# Patient Record
Sex: MALE | Race: WHITE
[De-identification: names, ages, dates, MRNs, and addresses within clinical notes are randomized per-mention and may not be internally consistent; named-entity substitution may affect disease eponyms.]

---

## 2017-03-04 ENCOUNTER — HOSPITAL ENCOUNTER (EMERGENCY)
Dept: HOSPITAL 62 - ER | Age: 37
Discharge: HOME | End: 2017-03-04
Payer: SELF-PAY

## 2017-03-04 VITALS — DIASTOLIC BLOOD PRESSURE: 78 MMHG | SYSTOLIC BLOOD PRESSURE: 141 MMHG

## 2017-03-04 DIAGNOSIS — M79.672: ICD-10-CM

## 2017-03-04 DIAGNOSIS — F17.210: ICD-10-CM

## 2017-03-04 DIAGNOSIS — S97.122A: Primary | ICD-10-CM
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
## 2017-03-04 PROCEDURE — 99283 EMERGENCY DEPT VISIT LOW MDM: CPT

## 2017-03-04 NOTE — ER DOCUMENT REPORT
ED General





- General


Chief Complaint: Toe Injury


Stated Complaint: TOE PAIN


Mode of Arrival: Ambulatory


Information source: Patient


Notes: 


28-year-old male presents after kicking his bed by accident with his left foot 

2 days ago.  Patient notes bruising pain of the fifth digit.  Denies any other 

injuries


TRAVEL OUTSIDE OF THE [place] IN LAST 30 DAYS: No





- HPI


Onset: Other


Onset/Duration: Persistent


Quality of pain: Achy


Severity: Mild


Pain Level: 1


Associated symptoms: Other


Exacerbated by: Walking


Relieved by: Denies


Similar symptoms previously: No


Recently seen / treated by doctor: No





- Related Data


Allergies/Adverse Reactions: 


 





tramadol HCl [From Ultram] Allergy (Verified 03/04/17 08:24)


 











Past Medical History





- Social History


Smoking Status: Current Every Day Smoker


Cigarette use (# per day): Yes


Chew tobacco use (# tins/day): No


Smoking Education Provided: No


Frequency of alcohol use: None


Drug Abuse: Bath salts


Family History: None - adopted


Patient has suicidal ideation: No


Patient has homicidal ideation: No


Pulmonary Medical History: Reports: Hx Asthma, Hx Bronchitis


Renal/ Medical History: Denies: Hx Peritoneal Dialysis


Past Surgical History: Reports: Hx Genitourinary Surgery - circumscised, Hx 

Herniorrhaphy, Hx Orthopedic Surgery - rt knee





- Immunizations


Immunizations up to date: Yes


Hx Diphtheria, Pertussis, Tetanus Vaccination: Yes





Review of Systems





- Review of Systems


Notes: 


REVIEW OF SYSTEMS:


CONSTITUTIONAL :  Denies fever,  chills, or sweats.  Denies recent illness.


EENT:   Denies eye, ear, throat, or mouth pain or symptoms.  Denies nasal or 

sinus congestion or discharge.  Denies throat, tongue, or mouth swelling or 

difficulty swallowing.


CARDIOVASCULAR:  Denies chest pain.  Denies palpitations or racing or irregular 

heart beat.  Denies ankle edema.


RESPIRATORY:  Denies cough, cold, or chest congestion.  Denies shortness of 

breath, difficulty breathing, or wheezing.


GASTROINTESTINAL:  Denies abdominal pain or distention.  Denies nausea, vomiting

, or diarrhea.  Denies blood in vomitus, stools, or per rectum.  Denies black, 

tarry stools.  Denies constipation.  


GENITOURINARY:  Denies difficulty urinating, painful urination, burning, 

frequency, blood in urine, or discharge.


MUSCULOSKELETAL: Admits to toe pain


SKIN:   Denies rash, lesions or sores.


HEMATOLOGIC :   Denies easy bruising or bleeding.


LYMPHATIC:  Denies swollen, enlarged glands.


NEUROLOGICAL:  Denies confusion or altered mental status.  Denies passing out 

or loss of consciousness.  Denies dizziness or lightheadedness.  Denies 

headache.  Denies weakness or paralysis or loss of use of either side.  Denies 

problems with gait or speech.  Denies sensory loss, numbness, or tingling.  

Denies seizures.


PSYCHIATRIC:  Denies anxiety or stress.  Denies depression, suicidal ideation, 

or homicidal ideation.





ALL OTHER SYSTEMS REVIEWED AND NEGATIVE.





Dictation was performed using Dragon voice recognition software 








PHYSICAL EXAMINATION:





GENERAL: Well-appearing, well-nourished and in no acute distress.





HEAD: Atraumatic, normocephalic.





EYES: Pupils equal round extraocular movements intact,  conjunctiva are normal.





ENT: Nares patent





NECK: Normal range of motion





LUNGS: No respiratory distress





Musculoskeletal: Normal range of motion





NEUROLOGICAL:  Normal speech, normal gait. 





PSYCH: Normal mood, normal affect.





SKIN: Mild ecchymosis noted of the fifth digit with edema of the left foot





Physical Exam





- Vital signs


Vitals: 





 











Temp Pulse Resp BP Pulse Ox


 


 97.8 F   79   16   148/86 H  99 


 


 03/04/17 08:08  03/04/17 08:08  03/04/17 08:08  03/04/17 08:08  03/04/17 08:08














Course





- Re-evaluation


Re-evalutation: 





03/04/17 09:06


X-ray noted no acute abnormality, patient will be kim splint is otherwise 

stable for discharge





Patient encouraged to follow-up with primary care physician for reevaluation or 

to return immediately if there are any other concerns





After performing a Medical Screening Examination, I estimate there is LOW risk 

for INTRACRANIAL HEMORRHAGE, UNSTABLE SPINE FRACTURE, CENTRAL CORD SYNDROME, 

CAUDA EQUINA, THORACIC AORTIC DISSECTION, PNEUMOTHORAX, PERFORATED BOWEL, 

RUPTURED ABDOMINAL AORTIC ANEURYSM, ACUTE TENDON RUPTURE, COMPARTMENT SYNDROME, 

or OPEN FRACTURE, thus I consider the discharge disposition reasonable. Also, 

there is no evidence or peritonitis, sepsis, or toxicity. The patient and I 

have discussed the diagnosis and risks, and we agree with discharging home to 

follow-up with their primary doctor with the understanding that symptoms and 

presentations can change. We also discussed returning to the Emergency 

Department immediately if new or worsening symptoms occur. We have discussed 

the symptoms which are most concerning (e.g., bloody stool, fever, changing or 

worsening pain, vomiting) that necessitate immediate return.





- Vital Signs


Vital signs: 





 











Temp Pulse Resp BP Pulse Ox


 


 97.8 F   79   16   148/86 H  99 


 


 03/04/17 08:08  03/04/17 08:08  03/04/17 08:08  03/04/17 08:08  03/04/17 08:08














Procedures





- Immobilization


  ** Left Toe 5th digit


Time completed: 09:07


Pre-Proc Neuro Vasc Exam: Normal


Immobilizer type: Other - Kim tape


Performed by: PCT


Post-Proc Neuro Vasc Exam: Normal


Alignment checked and good: Yes





Discharge





- Discharge


Clinical Impression: 


Crushing injury of fifth toe, left


Qualifiers:


 Encounter type: initial encounter Qualified Code(s): S97.122A - Crushing 

injury of left lesser toe(s), initial encounter





Foot pain


Qualifiers:


 Laterality: left Qualified Code(s): M79.672 - Pain in left foot





Condition: Stable


Disposition: HOME, SELF-CARE


Instructions:  Contusion ([facility])


Additional Instructions: 


Follow up with your physician tomorrow for further care or return to the ED 

IMMEDIATELY if symptoms worsen or new concerns occur


Prescriptions: 


Naproxen 500 mg PO BID #20 tablet

## 2017-08-10 ENCOUNTER — HOSPITAL ENCOUNTER (EMERGENCY)
Dept: HOSPITAL 62 - ER | Age: 37
Discharge: HOME | End: 2017-08-10
Payer: SELF-PAY

## 2017-08-10 VITALS — DIASTOLIC BLOOD PRESSURE: 89 MMHG | SYSTOLIC BLOOD PRESSURE: 145 MMHG

## 2017-08-10 DIAGNOSIS — M25.472: ICD-10-CM

## 2017-08-10 DIAGNOSIS — J45.909: ICD-10-CM
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
## 2017-08-10 DIAGNOSIS — M25.572: Primary | ICD-10-CM
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
## 2017-08-10 DIAGNOSIS — F17.200: ICD-10-CM

## 2017-08-10 PROCEDURE — 96372 THER/PROPH/DIAG INJ SC/IM: CPT

## 2017-08-10 PROCEDURE — 73610 X-RAY EXAM OF ANKLE: CPT

## 2017-08-10 PROCEDURE — 99283 EMERGENCY DEPT VISIT LOW MDM: CPT

## 2017-08-10 PROCEDURE — L1902 AFO ANKLE GAUNTLET PRE OTS: HCPCS

## 2017-08-10 NOTE — ER DOCUMENT REPORT
HPI





- HPI


Pain Level: 4


Notes: 


Patient is a 37-year-old male who presents the ED complaining of left lateral 

ankle/foot pain status post twist injury while at home about 6 hours ago.  

Patient states that he tripped over a [place] bone and states that he heard a 

pop in his ankle.  Patient states that he has had pain since then and has had 

difficulty ambulating because of the pain.  Patient states that he did notice 

some swelling to the lateral ankle.  The pain does not radiate and is described 

as a throb.  Patient states that he still has sensation into his toes and can 

still move his foot and toes around although it does cause discomfort.  Denies 

any fever, chest pain, palpitations, shortness of breath, wheeze, abdominal pain

, nausea/vomiting, calf pain, bruising.





- ROS


Notes: 


REVIEW OF SYSTEMS:


CONSTITUTIONAL :  Denies fever,  chills, or sweats.  Denies recent illness.


EENT:   Denies eye, ear, throat, or mouth pain or symptoms.  Denies nasal or 

sinus congestion or discharge.  Denies throat, tongue, or mouth swelling or 

difficulty swallowing.


CARDIOVASCULAR:  Denies chest pain.  Denies palpitations or racing or irregular 

heart beat.  Denies ankle edema.


RESPIRATORY:  Denies cough, cold, or chest congestion.  Denies shortness of 

breath, difficulty breathing, or wheezing.


GASTROINTESTINAL:  Denies abdominal pain or distention.  Denies nausea, vomiting

, or diarrhea.  Denies blood in vomitus, stools, or per rectum.  Denies black, 

tarry stools.  Denies constipation.  


GENITOURINARY:  Denies difficulty urinating, painful urination, burning, 

frequency, blood in urine, or discharge.


MUSCULOSKELETAL:  see hpi


SKIN:   Denies rash, lesions or sores.


NEUROLOGICAL:  Denies confusion or altered mental status.  Denies passing out 

or loss of consciousness.  Denies dizziness or lightheadedness.  Denies 

headache.  Denies weakness or paralysis or loss of use of either side.  Denies 

problems with gait or speech.  Denies sensory loss, numbness, or tingling.  





ALL OTHER SYSTEMS REVIEWED AND NEGATIVE.





Dictation was performed using Dragon voice recognition software





- REPRODUCTIVE


Reproductive: DENIES: Pregnant:





- DERM


Skin Color: Normal, Pink





Past Medical History





- Social History


Smoking Status: Current Every Day Smoker


Family History: None - adopted


Pulmonary Medical History: Reports: Hx Asthma, Hx Bronchitis


Renal/ Medical History: Denies: Hx Peritoneal Dialysis


Past Surgical History: Reports: Hx Genitourinary Surgery - circumscised, Hx 

Herniorrhaphy, Hx Orthopedic Surgery - rt knee





- Immunizations


Immunizations up to date: Yes


Hx Diphtheria, Pertussis, Tetanus Vaccination: Yes





Vertical Provider Document





- CONSTITUTIONAL


Agree With Documented VS: Yes


Notes: 


PHYSICAL EXAMINATION:





GENERAL: Well-appearing, well-nourished and in no acute distress.





LUNGS: Breath sounds clear to auscultation bilaterally and equal.  No wheezes 

rales or rhonchi.





HEART: Regular rate and rhythm without murmurs, rubs, gallops.





Musculoskeletal: Lt foot/ankle:  LROM to passive/active. Strength 5+/5. + mild 

swelling to the left lateral ankle.  + tenderness to palp of the 5th metatarsal 

and lateral malleolus.  No obvious ecchymosis noted.





Extremities:  No cyanosis, clubbing, or edema b/l.  Peripheral pulses 2+.  

Capillary refill less than 3 seconds.





NEUROLOGICAL:  Normal sensory, motor exams 





PSYCH: Normal mood, normal affect.





SKIN: Warm, Dry, normal turgor, no rashes or lesions noted.





- INFECTION CONTROL


TRAVEL OUTSIDE OF THE [place] IN LAST 30 DAYS: No





- RESPIRATORY


O2 Sat by Pulse Oximetry: 99





Course





- Re-evaluation


Re-evalutation: 





08/10/17 05:50


Patient is an afebrile, well-hydrated, 37-year-old male who presents the ED 

with suspect strain/sprain based on H&P.  Vitals are stable.  PE otherwise 

unremarkable.  X-ray unremarkable for any acute fracture or dislocation.  

Toradol 15 mg given IM as patient was requesting pain medication. recommend 

conservative measures for symptoms.  Recheck with PCM in 2-3 days.  Consider 

consult with orthopedics/physical therapy/podiatry.  Return to the ED with any 

worsening/concerning symptoms otherwise as reviewed discharge.  Patient is in 

agreement.





- Vital Signs


Vital signs: 


 











Temp Pulse Resp BP Pulse Ox


 


 98.4 F   88   18   143/92 H  99 


 


 08/10/17 04:12  08/10/17 04:12  08/10/17 04:12  08/10/17 04:12  08/10/17 04:12














Discharge





- Discharge


Clinical Impression: 


Left ankle pain


Qualifiers:


 Chronicity: acute Qualified Code(s): M25.572 - Pain in left ankle and joints 

of left foot





Condition: Stable


Disposition: HOME, SELF-CARE


Instructions:  Ankle Exercise Program ([facility]), Ankle Stirrup Splint ([facility]), Ice & 

Elevation ([facility]), Sprained Ankle ([facility]), Use of Crutches ([facility])


Additional Instructions: 


Rest, Ice, Compression, Elevation


Use splint as directed


Tylenol/ibuprofen as needed


Light stretches daily


Strength exercises as able


Moist heat and massage may help


F/u with your PCP in 2-3 days for a recheck


Consider consult(s) with Orthopedics/physical therapy for ongoing/worsening 

symptoms





Return to the ED with any worsening pain, swelling, numbness/tingling, muscle 

weakness, development of fever, or any other worsening/concerning symptoms as 

needed.


Forms:  Elevated Blood Pressure, Smoking Cessation Education


Referrals: 


[facility] [Provider Group] - Follow up as needed

## 2017-08-10 NOTE — RADIOLOGY REPORT (SQ)
EXAM DESCRIPTION:  ANKLE LEFT COMPLETE



COMPLETED DATE/TIME:  8/10/2017 4:27 am



REASON FOR STUDY:  injury



COMPARISON:  11/2/2013.  6/22/2014.



NUMBER OF VIEWS:  Three views.



TECHNIQUE:  AP, lateral, and oblique radiographic images acquired of the left ankle.



LIMITATIONS:  None.



FINDINGS:  MINERALIZATION: Normal.

BONES: No acute fracture or dislocation.  No worrisome bone lesions.  Small nonspecific cortical flat
tening and osteophyte at the medial aspect of the left talus consistent with prior radiographs, 11/2/
2013.  Mild-to-moderate osteoarthritis at the dorsum of the midfoot at the tarsometatarsal joints.

JOINTS: No effusions.

SOFT TISSUES: Mild lateral malleolar soft tissue swelling.

OTHER: No other significant finding.



IMPRESSION:  Mild lateral malleolar soft tissue swelling.  Mild-to-moderate osteoarthritis.



TECHNICAL DOCUMENTATION:  JOB ID:  [id number]

 2011 [employer]- All Rights Reserved

## 2020-03-28 ENCOUNTER — HOSPITAL ENCOUNTER (EMERGENCY)
Dept: HOSPITAL 62 - ER | Age: 40
Discharge: HOME | End: 2020-03-28
Payer: SELF-PAY

## 2020-03-28 VITALS — SYSTOLIC BLOOD PRESSURE: 157 MMHG | DIASTOLIC BLOOD PRESSURE: 98 MMHG

## 2020-03-28 DIAGNOSIS — K08.9: ICD-10-CM

## 2020-03-28 DIAGNOSIS — K04.7: Primary | ICD-10-CM

## 2020-03-28 DIAGNOSIS — F17.200: ICD-10-CM

## 2020-03-28 PROCEDURE — 99282 EMERGENCY DEPT VISIT SF MDM: CPT

## 2020-03-28 NOTE — ER DOCUMENT REPORT
HPI





- HPI


Time Seen by Provider: 03/28/20 03:01


Pain Level: 4


Context: 


Patient is a 39-year-old male that comes emergency department for chief 

complaint of dental pain.  He states it has been worsening for 4 days.  Pain is 

on both lower jaws.  He denies swelling of the face, fever, sore throat, neck 

pain.  He acknowledges that he has terrible teeth and needs multiple 

extractions.  He states he saving up money for sedation dentistry.  He denies 

any daily medications or medical problems.  Denies any complaints otherwise.








- REPRODUCTIVE


Reproductive: DENIES: Pregnant:





Past Medical History





- General


Information source: Patient





- Social History


Smoking Status: Current Every Day Smoker


Frequency of alcohol use: None


Drug Abuse: None


Lives with: Family


Family History: None - adopted


Patient has suicidal ideation: No


Patient has homicidal ideation: No


Pulmonary Medical History: Reports: Hx Asthma, Hx Bronchitis


Renal/ Medical History: Denies: Hx Peritoneal Dialysis


Past Surgical History: Reports: Hx Genitourinary Surgery - circumscised, Hx 

Herniorrhaphy, Hx Orthopedic Surgery - rt knee





- Immunizations


Immunizations up to date: Yes


Hx Diphtheria, Pertussis, Tetanus Vaccination: Yes





Vertical Provider Document





- CONSTITUTIONAL


General Appearance: WD/WN, Mild Distress - Patient pacing and appears mildly 

uncomfortable





- INFECTION CONTROL


TRAVEL OUTSIDE OF THE [place] IN LAST 30 DAYS: No





- HEENT


HEENT: Atraumatic, Normocephalic, PERRLA.  negative: Conjuctival Injection, 

Normal ENT Exam - Terrible dentition, widespread dental caries, there is 

erythema along most of the lower gumline as well.  Upper gumline unremarkable.  

There is no noted induration, fluctuance, or abscess.  Oral pharyngeal exam is 

unremarkable otherwise.  No swelling of the face., Pharyngeal Erythema, Tympanic

Membrane Red, Tympanic Membrane Bulging





- NECK


Neck: Normal Inspection.  negative: Lymphadenopathy-Left, Lymphadenopathy-Right





- RESPIRATORY


Respiratory: Breath Sounds Normal, No Respiratory Distress





- CARDIOVASCULAR


Cardiovascular: Regular Rate, Regular Rhythm.  negative: Tachycardia





- GI/ABDOMEN


Gastrointestinal: Abdomen Soft, Abdomen Non-Tender.  negative: Abdomen Tender





- BACK


Back: Normal Inspection





- MUSCULOSKELETAL/EXTREMETIES


Musculoskeletal/Extremeties: MAEW, FROM, Non-Tender





- NEURO


Level of Consciousness: Awake, Alert, Appropriate


Motor/Sensory: No Motor Deficit, No Sensory Deficit





- DERM


Integumentary: Warm, Dry, No Rash





Course





- Re-evaluation


Re-evalutation: 


Patient does appear to have a dental infection but there is no swelling of the 

face, no abscess, no swelling of the neck, no concerning findings otherwise.  

Starting on antibiotics, referred to dentist, discussed expectations and return 

precautions.  Patient states understanding and agreement.





- Vital Signs


Vital signs: 


                                        











Temp Pulse Resp BP Pulse Ox


 


 97.6 F   70   16   167/106 H  100 


 


 03/28/20 02:28  03/28/20 02:28  03/28/20 02:28 03/28/20 02:28 03/28/20 02:28














Discharge





- Discharge


Clinical Impression: 


 Pain, dental, Dental infection





Condition: Stable


Disposition: HOME, SELF-CARE


Instructions:  Oral Narcotic Medication ([facility])


Additional Instructions: 


Your evaluation is consistent with a dental infection.  Take antibiotics as 

prescribed to completion.


Follow close with a dentist for this will continue to happen.


Return if you worsen including swelling of the face, fever, or any other 

concerning or worsening symptoms.


________________________





[facility] [address], [address] [phone]


Prescriptions: 


Penicillin V Potassium [Penicillin Vk 500 mg Tablet] 500 mg PO BID #20 tablet


Forms:  Return to Work

## 2020-09-08 ENCOUNTER — HOSPITAL ENCOUNTER (EMERGENCY)
Dept: HOSPITAL 62 - ER | Age: 40
LOS: 1 days | Discharge: HOME | End: 2020-09-09
Payer: SELF-PAY

## 2020-09-08 DIAGNOSIS — S49.91XA: Primary | ICD-10-CM

## 2020-09-08 DIAGNOSIS — F17.200: ICD-10-CM

## 2020-09-08 DIAGNOSIS — M62.838: ICD-10-CM
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
## 2020-09-08 PROCEDURE — 99283 EMERGENCY DEPT VISIT LOW MDM: CPT

## 2020-09-09 VITALS — SYSTOLIC BLOOD PRESSURE: 159 MMHG | DIASTOLIC BLOOD PRESSURE: 95 MMHG

## 2020-09-09 NOTE — ER DOCUMENT REPORT
HPI





- HPI


Time Seen by Provider: 09/09/20 03:21


Pain Level: 4


Context: 


Patient is a 40-year-old male that comes emergency department for chief 

complaint of right shoulder injury.  Patient states he was lifting a couch this 

evening and felt a pop in his right shoulder.  Patient states he is taking 

ibuprofen without significant improvement.  He denies any other injuries or any 

other complaints.  He denies numbness in the arm.  He denies any daily 

medications or diagnosed medical history.











- REPRODUCTIVE


Reproductive: DENIES: Pregnant:





- MUSCULOSKELETAL


Musculoskeletal: REPORTS: Extremity pain - rt shoulder





Past Medical History





- General


Information source: Patient





- Social History


Smoking Status: Current Every Day Smoker


Drug Abuse: None


Lives with: Family


Family History: None - adopted


Pulmonary Medical History: Reports: Hx Asthma, Hx Bronchitis


Renal/ Medical History: Denies: Hx Peritoneal Dialysis


Past Surgical History: Reports: Hx Genitourinary Surgery - circumscised, Hx 

Herniorrhaphy, Hx Orthopedic Surgery - rt knee





- Immunizations


Immunizations up to date: Yes


Hx Diphtheria, Pertussis, Tetanus Vaccination: Yes





Vertical Provider Document





- CONSTITUTIONAL


General Appearance: Moderate Distress - Patient peers to be in pain, holding his

left arm close to his body





- INFECTION CONTROL


TRAVEL OUTSIDE OF THE [place] IN LAST 30 DAYS: No





- HEENT


HEENT: Atraumatic, Normal ENT Exam, Normocephalic





- NECK


Neck: Normal Inspection





- RESPIRATORY


Respiratory: Breath Sounds Normal, No Respiratory Distress





- CARDIOVASCULAR


Cardiovascular: Regular Rate, Regular Rhythm





- GI/ABDOMEN


Gastrointestinal: Abdomen Soft, Abdomen Non-Tender





- BACK


Back: Normal Inspection - Non-tender back generally on palpation. No midline 

tenderness, no saddle anesthesia, no signs of trauma. Normal upper and lower 

extremity range of motion, normal strength, normal distal neurovascular exam.





- MUSCULOSKELETAL/EXTREMETIES


Musculoskeletal/Extremeties: MAEW, FROM.  negative: Non-Tender - Patient is 

tender over the supraspinatus and the posterior aspect of the right shoulder, 

tender over the deltoid, has very limited range of motion of the shoulder 

without significant pain and noted spasm.  Normal elbow exam, wrist exam, normal

distal neurovascular exam and strength.  Otherwise unremarkable.





Course





- Re-evaluation


Re-evalutation: 


Patient with very tender right rotator cuff on evaluation, very limited range of

motion on evaluation, a lot of muscle spasm is noted.  Distal neurovascular exam

unremarkable, no traumatic injuries, x-rays unremarkable.  Patient placed in 

sling which did help his symptoms, because of his significant muscle spasm in 

the shoulder patient was placed on diazepam, referred to orthopedics, discussed 

rotator cuff injuries, expectations, return precautions.  Patient states 

understanding and agreement.





- Vital Signs


Vital signs: 


                                        











Temp Pulse Resp BP Pulse Ox


 


 98.3 F   55 L  16   159/95 H  97 


 


 09/08/20 23:04  09/09/20 04:08  09/09/20 04:08  09/09/20 04:08  09/09/20 04:08














Procedures





- Immobilization


  ** Right shoulder


Pre-Proc Neuro Vasc Exam: Normal


Immobilizer type: Sling


Performed by: RN


Post-Proc Neuro Vasc Exam: Normal


Alignment checked and good: Yes





Discharge





- Discharge


Clinical Impression: 


 Muscle spasm





Right shoulder pain


Qualifiers:


 Chronicity: acute Qualified Code(s): M25.511 - Pain in right shoulder





Right shoulder injury


Qualifiers:


 Encounter type: initial encounter Qualified Code(s): S49.91XA - Unspecified 

injury of right shoulder and upper arm, initial encounter





Condition: Stable


Disposition: HOME, SELF-CARE


Additional Instructions: 


Your evaluation is most consistent with a torn rotator cuff and muscle spasm.  

The x-ray does not show a fracture.  Wear the sling, remember to take the arm 

out of the sling frequently to perform range of motion as we discussed.  Apply 

ice to the shoulder 3-4 times a day, take anti-inflammatory muscle x-rays 

prescribed with the precautions.  Follow-up with orthopedics referral as 

directed.  Return if you worsen including severe worsening swelling or pain, 

numbness, or any other concerning symptoms.


Prescriptions: 


Naproxen 500 mg PO BID PRN #20 tablet


 PRN Reason: 


Diazepam [Valium 5 mg Tablet] 1 - 2 tab PO TID PRN #15 tablet


 PRN Reason: 


Forms:  Return to Work


Referrals: 


RICARDO ESPAÑA MD [ACTIVE STAFF] - Follow up as needed

## 2020-09-09 NOTE — RADIOLOGY REPORT (SQ)
CLINICAL INDICATION: bone pain. .



TECHNIQUE: 3 view(s) were obtained of the right shoulder.



COMPARISON: None.



FINDINGS:

No acute displaced fracture is identified of the shoulder. 

Alignment appears anatomic.  Joint spaces are within normal

limits for age.  Surrounding soft tissues are unremarkable.



IMPRESSION: 

No evidence of acute bony injury to the shoulder.

## 2020-09-13 ENCOUNTER — HOSPITAL ENCOUNTER (EMERGENCY)
Dept: HOSPITAL 62 - ER | Age: 40
LOS: 1 days | Discharge: HOME | End: 2020-09-14
Payer: SELF-PAY

## 2020-09-13 DIAGNOSIS — S46.011D: Primary | ICD-10-CM

## 2020-09-13 DIAGNOSIS — J45.909: ICD-10-CM

## 2020-09-13 DIAGNOSIS — M25.511: ICD-10-CM
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
## 2020-09-13 DIAGNOSIS — M79.89: ICD-10-CM

## 2020-09-13 PROCEDURE — 99284 EMERGENCY DEPT VISIT MOD MDM: CPT

## 2020-09-13 PROCEDURE — S0119 ONDANSETRON 4 MG: HCPCS

## 2020-09-14 VITALS — DIASTOLIC BLOOD PRESSURE: 97 MMHG | SYSTOLIC BLOOD PRESSURE: 161 MMHG

## 2020-09-14 NOTE — ER DOCUMENT REPORT
HPI





- HPI


Time Seen by Provider: 09/14/20 00:57


Notes: 





CHIEF COMPLAINT: Right shoulder pain





HPI: 40-year-old right-hand-dominant male presenting again for right shoulder 

pain.  Patient injured the shoulder 5 days ago lifting a couch, felt a pop in 

the shoulder, states he came to the emergency department had an x-ray that was 

negative for fracture was placed in a sling and is to follow-up with orthopedics

whom he has not yet seen.  Patient states he was discharged on a muscle relaxer 

and anti-inflammatory and is having severe pain in the shoulder with movement.





ROS: See HPI - all other systems were reviewed and are otherwise negative


Constitutional: no fever 


Integumentary: no rash 


Allergy: no hives 


Musculoskeletal: + extremity pain or swelling 


Neurological: no numbness/tingling, no weakness





MEDICATIONS: I agree with the patient medications as charted by the RN.





ALLERGIES: I agree with the allergies as charted by the RN.





PAST MEDICAL HISTORY/PAST SURGICAL HISTORY: Reviewed and agree as charted by RN.





SOCIAL HISTORY: Reviewed and agree as charted by RN.





FAMILY HISTORY: No significant familial comorbid conditions directly related to 

patient complaint





EXAM:


Reviewed vital signs as charted by RN.


CONSTITUTIONAL: Alert and oriented and responds appropriately to questions. 

Well-appearing; well-nourished


HEAD: Normocephalic; atraumatic


EYES:  Conjunctivae clear, sclerae non-icteric


ENT: normal nose; no rhinorrhea; moist mucous membranes


NECK: Supple without meningismus; non-tender; no cervical lymphadenopathy, no 

masses


CARD:  symmetric distal pulses


RESP: Normal chest excursion without splinting or tachypnea


ABD/GI: non-distended.


BACK:  The back appears normal and is non-tender to palpation


EXT: Limited range of motion in the right arm at the shoulder secondary to 

complaints of pain patient is resistant to having the arm moved.  Brachial 

radial and ulnar pulses are present in the right upper extremity.  Sensation 

intact in the fingertips with capillary refill less than 3 seconds.


SKIN: Normal color for age and race; warm; dry; good turgor; no acute lesions 

noted


NEURO:  Motor and sensory function intact 


PSYCH: The patient's mood and manner are appropriate. Grooming and personal 

hygiene are appropriate.





MDM: 40-year-old male probably with a rotator cuff tear.  Will give him sling 

precautions.  He was prescribed Valium and naproxen previously still having 

severe pain, will prescribe a short course of Percocet for pain follow-up 

orthopedics





- REPRODUCTIVE


Reproductive: DENIES: Pregnant:





Past Medical History





- Social History


Smoking Status: Unknown if Ever Smoked


Family History: None - adopted


Pulmonary Medical History: Reports: Hx Asthma, Hx Bronchitis


Renal/ Medical History: Denies: Hx Peritoneal Dialysis


Past Surgical History: Reports: Hx Genitourinary Surgery - circumscised, Hx 

Herniorrhaphy, Hx Orthopedic Surgery - rt knee





- Immunizations


Immunizations up to date: Yes


Hx Diphtheria, Pertussis, Tetanus Vaccination: Yes





Vertical Provider Document





- INFECTION CONTROL


TRAVEL OUTSIDE OF THE [place] IN LAST 30 DAYS: No





Course





- Vital Signs


Vital signs: 


                                        











Temp Pulse Resp BP Pulse Ox


 


 98.8 F   79   20   166/97 H  100 


 


 09/14/20 00:03  09/14/20 00:03  09/14/20 00:03  09/14/20 00:03  09/14/20 00:03














Discharge





- Discharge


Clinical Impression: 


Rotator cuff tear


Qualifiers:


 Rotator cuff tear extent: unspecified tear extent Rotator cuff tear trauma 

status: traumatic Encounter type: subsequent encounter Laterality: right 

Qualified Code(s): S46.011D - Strain of muscle(s) and tendon(s) of the rotator 

cuff of right shoulder, subsequent encounter





Condition: Stable


Disposition: HOME, SELF-CARE


Instructions:  Rotator Cuff Injury ([facility])


Additional Instructions: 


1. ice the shoulder twice daily for 10 minutes each time


2. use the sling for comfort during the day only for 2-3 days.  Do not sleep in 

the sling


3. take the arm out of the sling 3-4 times daily and perform gentle range of 

motion exercises to maintain flexibility in the shoulder


4. medications for pain as directed.  No driving on narcotics


5.  It is imperative that you follow-up with the orthopedic clinic as soon as 

possible for further evaluation of the shoulder, it is likely you will need 

outpatient imaging such as MRI to further delineate the injury to the shoulder


Prescriptions: 


Oxycodone HCl/Acetaminophen [Percocet 5-325 mg Tablet] 1 tab PO Q4H PRN #15 tab


 PRN Reason: 


Ondansetron [Zofran Odt 4 mg Tablet] 1 - 2 tab PO Q4H PRN #15 tab.rapdis


 PRN Reason: For Nausea/Vomiting